# Patient Record
Sex: MALE | Race: BLACK OR AFRICAN AMERICAN | URBAN - METROPOLITAN AREA
[De-identification: names, ages, dates, MRNs, and addresses within clinical notes are randomized per-mention and may not be internally consistent; named-entity substitution may affect disease eponyms.]

---
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
## 2021-07-01 ENCOUNTER — RESULT REVIEW (OUTPATIENT)
Age: 21
End: 2021-07-01

## 2021-07-01 ENCOUNTER — APPOINTMENT (OUTPATIENT)
Dept: RHEUMATOLOGY | Facility: CLINIC | Age: 21
End: 2021-07-01
Payer: MEDICAID

## 2021-07-01 VITALS
HEART RATE: 73 BPM | RESPIRATION RATE: 16 BRPM | HEIGHT: 72 IN | SYSTOLIC BLOOD PRESSURE: 107 MMHG | BODY MASS INDEX: 23.43 KG/M2 | DIASTOLIC BLOOD PRESSURE: 58 MMHG | TEMPERATURE: 97.2 F | WEIGHT: 173 LBS | OXYGEN SATURATION: 98 %

## 2021-07-01 DIAGNOSIS — M19.079 PRIMARY OSTEOARTHRITIS, UNSPECIFIED ANKLE AND FOOT: ICD-10-CM

## 2021-07-01 PROCEDURE — 99203 OFFICE O/P NEW LOW 30 MIN: CPT

## 2021-07-02 ENCOUNTER — TRANSCRIPTION ENCOUNTER (OUTPATIENT)
Age: 21
End: 2021-07-02

## 2021-07-06 LAB
ALBUMIN SERPL ELPH-MCNC: 4.6 G/DL
ALP BLD-CCNC: 58 U/L
ALT SERPL-CCNC: 22 U/L
ANION GAP SERPL CALC-SCNC: 13 MMOL/L
AST SERPL-CCNC: 26 U/L
BASOPHILS # BLD AUTO: 0.01 K/UL
BASOPHILS NFR BLD AUTO: 0.3 %
BILIRUB SERPL-MCNC: 0.4 MG/DL
BUN SERPL-MCNC: 9 MG/DL
C TRACH RRNA SPEC QL NAA+PROBE: NOT DETECTED
CALCIUM SERPL-MCNC: 9.4 MG/DL
CCP AB SER IA-ACNC: <8 UNITS
CHLORIDE SERPL-SCNC: 106 MMOL/L
CO2 SERPL-SCNC: 23 MMOL/L
CREAT SERPL-MCNC: 1.07 MG/DL
CRP SERPL-MCNC: <3 MG/L
EOSINOPHIL # BLD AUTO: 0.02 K/UL
EOSINOPHIL NFR BLD AUTO: 0.7 %
ERYTHROCYTE [SEDIMENTATION RATE] IN BLOOD BY WESTERGREN METHOD: 3 MM/HR
GLUCOSE SERPL-MCNC: 81 MG/DL
HCT VFR BLD CALC: 39.1 %
HGB BLD-MCNC: 12.9 G/DL
IMM GRANULOCYTES NFR BLD AUTO: 0.3 %
LYMPHOCYTES # BLD AUTO: 1.26 K/UL
LYMPHOCYTES NFR BLD AUTO: 44.1 %
MAN DIFF?: NORMAL
MCHC RBC-ENTMCNC: 27.3 PG
MCHC RBC-ENTMCNC: 33 GM/DL
MCV RBC AUTO: 82.8 FL
MONOCYTES # BLD AUTO: 0.21 K/UL
MONOCYTES NFR BLD AUTO: 7.3 %
N GONORRHOEA RRNA SPEC QL NAA+PROBE: NOT DETECTED
NEUTROPHILS # BLD AUTO: 1.35 K/UL
NEUTROPHILS NFR BLD AUTO: 47.3 %
PLATELET # BLD AUTO: 192 K/UL
POTASSIUM SERPL-SCNC: 4.3 MMOL/L
PROT SERPL-MCNC: 6.8 G/DL
RBC # BLD: 4.72 M/UL
RBC # FLD: 14.6 %
RF+CCP IGG SER-IMP: NEGATIVE
RHEUMATOID FACT SER QL: <10 IU/ML
SODIUM SERPL-SCNC: 143 MMOL/L
SOURCE AMPLIFICATION: NORMAL
URATE SERPL-MCNC: 8 MG/DL
WBC # FLD AUTO: 2.86 K/UL

## 2021-07-07 LAB

## 2021-07-14 ENCOUNTER — OUTPATIENT (OUTPATIENT)
Dept: OUTPATIENT SERVICES | Facility: HOSPITAL | Age: 21
LOS: 1 days | End: 2021-07-14
Payer: MEDICAID

## 2021-07-14 ENCOUNTER — APPOINTMENT (OUTPATIENT)
Dept: RADIOLOGY | Facility: CLINIC | Age: 21
End: 2021-07-14
Payer: MEDICAID

## 2021-07-14 ENCOUNTER — APPOINTMENT (OUTPATIENT)
Dept: MRI IMAGING | Facility: CLINIC | Age: 21
End: 2021-07-14
Payer: MEDICAID

## 2021-07-14 DIAGNOSIS — M19.079 PRIMARY OSTEOARTHRITIS, UNSPECIFIED ANKLE AND FOOT: ICD-10-CM

## 2021-07-14 PROCEDURE — 73600 X-RAY EXAM OF ANKLE: CPT | Mod: 26,50

## 2021-07-14 PROCEDURE — 73721 MRI JNT OF LWR EXTRE W/O DYE: CPT | Mod: 26,RT

## 2021-07-14 PROCEDURE — 73721 MRI JNT OF LWR EXTRE W/O DYE: CPT

## 2021-07-14 PROCEDURE — 73600 X-RAY EXAM OF ANKLE: CPT

## 2021-07-20 ENCOUNTER — APPOINTMENT (OUTPATIENT)
Dept: ORTHOPEDIC SURGERY | Facility: CLINIC | Age: 21
End: 2021-07-20
Payer: MEDICAID

## 2021-07-20 VITALS
HEART RATE: 76 BPM | BODY MASS INDEX: 22.53 KG/M2 | DIASTOLIC BLOOD PRESSURE: 67 MMHG | SYSTOLIC BLOOD PRESSURE: 110 MMHG | WEIGHT: 170 LBS | HEIGHT: 73 IN

## 2021-07-20 DIAGNOSIS — M21.41 FLAT FOOT [PES PLANUS] (ACQUIRED), RIGHT FOOT: ICD-10-CM

## 2021-07-20 DIAGNOSIS — M24.673 ANKYLOSIS, UNSPECIFIED ANKLE: ICD-10-CM

## 2021-07-20 DIAGNOSIS — M19.071 PRIMARY OSTEOARTHRITIS, RIGHT ANKLE AND FOOT: ICD-10-CM

## 2021-07-20 DIAGNOSIS — M21.42 FLAT FOOT [PES PLANUS] (ACQUIRED), RIGHT FOOT: ICD-10-CM

## 2021-07-20 DIAGNOSIS — M94.279 CHONDROMALACIA, UNSPECIFIED ANKLE AND JOINTS OF FOOT: ICD-10-CM

## 2021-07-20 DIAGNOSIS — M25.871 OTHER SPECIFIED JOINT DISORDERS, RIGHT ANKLE AND FOOT: ICD-10-CM

## 2021-07-20 PROCEDURE — 73610 X-RAY EXAM OF ANKLE: CPT | Mod: RT

## 2021-07-20 PROCEDURE — 99203 OFFICE O/P NEW LOW 30 MIN: CPT

## 2023-06-22 NOTE — ED PEDIATRIC TRIAGE NOTE - AS TEMP SITE
received fax from [facility] [address] dated on 06/22/23 stating the [facility] 10MG/0.5ML SOLUTION PEN-INJ has been denied due to pt not trying and had a poor response to three medications that are used to treat pt condition for at least 2 weeks.     #[id number]    CASE#[id number] oral

## 2024-08-30 ENCOUNTER — EMERGENCY (EMERGENCY)
Facility: HOSPITAL | Age: 24
LOS: 1 days | Discharge: HOME | End: 2024-08-30
Attending: INTERNAL MEDICINE | Admitting: EMERGENCY MEDICINE
Payer: SELF-PAY

## 2024-08-30 VITALS
RESPIRATION RATE: 15 BRPM | HEART RATE: 66 BPM | DIASTOLIC BLOOD PRESSURE: 80 MMHG | OXYGEN SATURATION: 99 % | SYSTOLIC BLOOD PRESSURE: 144 MMHG | WEIGHT: 166.01 LBS | TEMPERATURE: 99 F

## 2024-08-30 VITALS
DIASTOLIC BLOOD PRESSURE: 59 MMHG | RESPIRATION RATE: 20 BRPM | SYSTOLIC BLOOD PRESSURE: 121 MMHG | TEMPERATURE: 98 F | HEART RATE: 52 BPM | OXYGEN SATURATION: 98 %

## 2024-08-30 LAB
ALBUMIN SERPL ELPH-MCNC: 5.4 G/DL — HIGH (ref 3.3–5)
ALP SERPL-CCNC: 60 U/L — SIGNIFICANT CHANGE UP (ref 30–120)
ALT FLD-CCNC: 19 U/L — SIGNIFICANT CHANGE UP (ref 10–60)
ANION GAP SERPL CALC-SCNC: 10 MMOL/L — SIGNIFICANT CHANGE UP (ref 5–17)
APTT BLD: 37.3 SEC — HIGH (ref 24.5–35.6)
AST SERPL-CCNC: 30 U/L — SIGNIFICANT CHANGE UP (ref 10–40)
BASOPHILS # BLD AUTO: 0.03 K/UL — SIGNIFICANT CHANGE UP (ref 0–0.2)
BASOPHILS NFR BLD AUTO: 0.4 % — SIGNIFICANT CHANGE UP (ref 0–2)
BILIRUB SERPL-MCNC: 1 MG/DL — SIGNIFICANT CHANGE UP (ref 0.2–1.2)
BUN SERPL-MCNC: 11 MG/DL — SIGNIFICANT CHANGE UP (ref 7–23)
CALCIUM SERPL-MCNC: 10.5 MG/DL — SIGNIFICANT CHANGE UP (ref 8.4–10.5)
CHLORIDE SERPL-SCNC: 102 MMOL/L — SIGNIFICANT CHANGE UP (ref 96–108)
CO2 SERPL-SCNC: 28 MMOL/L — SIGNIFICANT CHANGE UP (ref 22–31)
CREAT SERPL-MCNC: 1.26 MG/DL — SIGNIFICANT CHANGE UP (ref 0.5–1.3)
D DIMER BLD IA.RAPID-MCNC: <150 NG/ML DDU — SIGNIFICANT CHANGE UP
EGFR: 82 ML/MIN/1.73M2 — SIGNIFICANT CHANGE UP
EOSINOPHIL # BLD AUTO: 0 K/UL — SIGNIFICANT CHANGE UP (ref 0–0.5)
EOSINOPHIL NFR BLD AUTO: 0 % — SIGNIFICANT CHANGE UP (ref 0–6)
GLUCOSE SERPL-MCNC: 93 MG/DL — SIGNIFICANT CHANGE UP (ref 70–99)
HCT VFR BLD CALC: 47 % — SIGNIFICANT CHANGE UP (ref 39–50)
HGB BLD-MCNC: 16.1 G/DL — SIGNIFICANT CHANGE UP (ref 13–17)
IMM GRANULOCYTES NFR BLD AUTO: 0.1 % — SIGNIFICANT CHANGE UP (ref 0–0.9)
INR BLD: 1.15 RATIO — SIGNIFICANT CHANGE UP (ref 0.85–1.18)
LYMPHOCYTES # BLD AUTO: 1.84 K/UL — SIGNIFICANT CHANGE UP (ref 1–3.3)
LYMPHOCYTES # BLD AUTO: 27.1 % — SIGNIFICANT CHANGE UP (ref 13–44)
MAGNESIUM SERPL-MCNC: 2 MG/DL — SIGNIFICANT CHANGE UP (ref 1.6–2.6)
MCHC RBC-ENTMCNC: 27.9 PG — SIGNIFICANT CHANGE UP (ref 27–34)
MCHC RBC-ENTMCNC: 34.3 GM/DL — SIGNIFICANT CHANGE UP (ref 32–36)
MCV RBC AUTO: 81.5 FL — SIGNIFICANT CHANGE UP (ref 80–100)
MONOCYTES # BLD AUTO: 0.54 K/UL — SIGNIFICANT CHANGE UP (ref 0–0.9)
MONOCYTES NFR BLD AUTO: 8 % — SIGNIFICANT CHANGE UP (ref 2–14)
NEUTROPHILS # BLD AUTO: 4.37 K/UL — SIGNIFICANT CHANGE UP (ref 1.8–7.4)
NEUTROPHILS NFR BLD AUTO: 64.4 % — SIGNIFICANT CHANGE UP (ref 43–77)
NRBC # BLD: 0 /100 WBCS — SIGNIFICANT CHANGE UP (ref 0–0)
NT-PROBNP SERPL-SCNC: 9 PG/ML — SIGNIFICANT CHANGE UP (ref 0–125)
PLATELET # BLD AUTO: 216 K/UL — SIGNIFICANT CHANGE UP (ref 150–400)
POTASSIUM SERPL-MCNC: 3.8 MMOL/L — SIGNIFICANT CHANGE UP (ref 3.5–5.3)
POTASSIUM SERPL-SCNC: 3.8 MMOL/L — SIGNIFICANT CHANGE UP (ref 3.5–5.3)
PROT SERPL-MCNC: 9 G/DL — HIGH (ref 6–8.3)
PROTHROM AB SERPL-ACNC: 12.8 SEC — SIGNIFICANT CHANGE UP (ref 9.5–13)
RBC # BLD: 5.77 M/UL — SIGNIFICANT CHANGE UP (ref 4.2–5.8)
RBC # FLD: 13.2 % — SIGNIFICANT CHANGE UP (ref 10.3–14.5)
SODIUM SERPL-SCNC: 140 MMOL/L — SIGNIFICANT CHANGE UP (ref 135–145)
TROPONIN I, HIGH SENSITIVITY RESULT: <4 NG/L — SIGNIFICANT CHANGE UP
WBC # BLD: 6.79 K/UL — SIGNIFICANT CHANGE UP (ref 3.8–10.5)
WBC # FLD AUTO: 6.79 K/UL — SIGNIFICANT CHANGE UP (ref 3.8–10.5)

## 2024-08-30 PROCEDURE — 93005 ELECTROCARDIOGRAM TRACING: CPT

## 2024-08-30 PROCEDURE — 80053 COMPREHEN METABOLIC PANEL: CPT

## 2024-08-30 PROCEDURE — 99284 EMERGENCY DEPT VISIT MOD MDM: CPT

## 2024-08-30 PROCEDURE — 85610 PROTHROMBIN TIME: CPT

## 2024-08-30 PROCEDURE — 36415 COLL VENOUS BLD VENIPUNCTURE: CPT

## 2024-08-30 PROCEDURE — 83735 ASSAY OF MAGNESIUM: CPT

## 2024-08-30 PROCEDURE — 83880 ASSAY OF NATRIURETIC PEPTIDE: CPT

## 2024-08-30 PROCEDURE — 85025 COMPLETE CBC W/AUTO DIFF WBC: CPT

## 2024-08-30 PROCEDURE — 85379 FIBRIN DEGRADATION QUANT: CPT

## 2024-08-30 PROCEDURE — 71046 X-RAY EXAM CHEST 2 VIEWS: CPT

## 2024-08-30 PROCEDURE — 93010 ELECTROCARDIOGRAM REPORT: CPT

## 2024-08-30 PROCEDURE — 71046 X-RAY EXAM CHEST 2 VIEWS: CPT | Mod: 26

## 2024-08-30 PROCEDURE — 96374 THER/PROPH/DIAG INJ IV PUSH: CPT

## 2024-08-30 PROCEDURE — 99285 EMERGENCY DEPT VISIT HI MDM: CPT | Mod: 25

## 2024-08-30 PROCEDURE — 85730 THROMBOPLASTIN TIME PARTIAL: CPT

## 2024-08-30 PROCEDURE — 84484 ASSAY OF TROPONIN QUANT: CPT

## 2024-08-30 RX ORDER — ACETAMINOPHEN 325 MG/1
1000 TABLET ORAL ONCE
Refills: 0 | Status: COMPLETED | OUTPATIENT
Start: 2024-08-30 | End: 2024-08-30

## 2024-08-30 RX ADMIN — ACETAMINOPHEN 400 MILLIGRAM(S): 325 TABLET ORAL at 18:53

## 2024-08-30 NOTE — ED ADULT NURSE NOTE - OBJECTIVE STATEMENT
I have chest pain since yesterday, I have been smoking marihuana and vape.    patient has no pmh and ekg done, IV accessed and tolerating. Labs drawn & sent results pending. ekg done,

## 2024-08-30 NOTE — ED PROVIDER NOTE - PATIENT PORTAL LINK FT
You can access the FollowMyHealth Patient Portal offered by [facility] by registering at the following website: [email or web address]. By joining [facility]’s FollowMyHealth portal, you will also be able to view your health information using other applications (apps) compatible with our system.

## 2024-08-30 NOTE — ED PROVIDER NOTE - TEMPLATE, MLM
Consult received from RN, requesting MSW-CM to meet with Patient's Mother per her request  Mother with Housing questions  SRIRAM-CM met with Patient and Mother in exam-room, introduced self, role and reason for visit  Mother requesting assistance with applying for Housing  [employer] information given to Mother to apply on line  They are accepting applications online only  Also encouraged to apply for housing in other counties  Mother agreed with plan  SRIRAM-CM will remain available as needed 
General

## 2024-08-30 NOTE — ED PROVIDER NOTE - NSFOLLOWUPINSTRUCTIONS_ED_ALL_ED_FT
Chest pain can be caused by a range of conditions, from not serious to life-threatening. Chest pain can be a symptom of a digestive problem, such as acid reflux or a stomach ulcer. An anxiety attack or a strong emotion, such as anger, can also cause chest pain. Infection, inflammation, or a fracture in the bones or cartilage in your chest can cause pain or discomfort. Sometimes chest pain or pressure is caused by poor blood flow to your heart (angina). Chest pain may also be caused by life-threatening conditions such as a heart attack or blood clot in your lungs.    DISCHARGE INSTRUCTIONS:    Call your local emergency number (911 in the ) or have someone call if:    You have any of the following signs of a heart attack:  Squeezing, pressure, or pain in your chest    You may also have any of the following:  Discomfort or pain in your back, neck, jaw, stomach, or arm    Shortness of breath    Nausea or vomiting    Lightheadedness or a sudden cold sweat    Seek care immediately if:    You have chest discomfort that gets worse, even with medicine.    You cough or vomit blood.    Your bowel movements are black or bloody.    You cannot stop vomiting, or it hurts to swallow.  Call your doctor if:    You have questions or concerns about your condition or care.    Medicines:    Medicines may be given to treat the cause of your chest pain. Examples include pain medicine, anxiety medicine, or medicines to increase blood flow to your heart.    Do not take certain medicines without asking your healthcare provider first. These include NSAIDs, herbal or vitamin supplements, and hormones, such as estrogen or progestin.    Take your medicine as directed. Contact your healthcare provider if you think your medicine is not helping or if you have side effects. Tell your provider if you are allergic to any medicine. Keep a list of the medicines, vitamins, and herbs you take. Include the amounts, and when and why you take them. Bring the list or the pill bottles to follow-up visits. Carry your medicine list with you in case of an emergency.  Healthy living tips: If the cause of your chest pain is known, your healthcare provider will give you specific guidelines to follow. The following are general healthy guidelines:    Do not smoke. Nicotine and other chemicals in cigarettes and cigars can cause lung and heart damage. Ask your healthcare provider for information if you currently smoke and need help to quit. E-cigarettes or smokeless tobacco still contain nicotine. Talk to your healthcare provider before you use these products.    Choose a variety of healthy foods as often as possible. Include fresh, frozen, or canned fruits and vegetables. Also include low-fat dairy products, fish, chicken (without skin), and lean meats. Your healthcare provider or a dietitian can help you create meal plans. You may need to avoid certain foods or drinks if your pain is caused by a digestion problem.  Healthy Foods      Lower your sodium (salt) intake. Limit foods that are high in sodium, such as canned foods, salty snacks, and cold cuts. If you add salt when you cook food, do not add more at the table. Choose low-sodium canned foods as much as possible.        Drink plenty of water every day. Water helps your body to control temperature and blood pressure. Ask your healthcare provider how much water you should drink every day.    Ask about activity. Your healthcare provider will tell you which activities to limit or avoid. Ask when you can drive, return to work, and have sex. Ask about the best exercise plan for you.    Maintain a healthy weight. Ask your healthcare provider what a healthy weight is for you. Ask him or her to help you create a safe weight loss plan if you are overweight.    Ask about vaccines you may need. Your healthcare provider can tell you which vaccines you need, and when to get them. The following vaccines help prevent diseases that can become serious for a person with a heart condition:  The influenza (flu) vaccine is given each year. Get a flu vaccine as soon as recommended, usually in September or October.    The pneumonia vaccine is usually given every 5 years. Your healthcare provider may recommend the pneumonia vaccine if you are 65 or older.    COVID-19 vaccines are given to adults as a shot. At least 1 dose of an updated vaccine is recommended for all adults. COVID-19 vaccines are updated throughout the year. Adults 65 or older need a second dose of updated vaccine at least 4 months after the first dose. Your healthcare provider can help you schedule all needed doses as updated vaccines become available.  Prevent Heart Disease   Follow up with your doctor within 72 hours, or as directed: You may need to return for more tests to find the cause of your chest pain. You may be referred to a specialist, such as a cardiologist or gastroenterologist. Write down your questions so you remember to ask them during your visits.

## 2024-08-30 NOTE — ED PROVIDER NOTE - NSFOLLOWUPCLINICS_GEN_ALL_ED_FT
[facility]  Internal Medicine  [address] [place] [id number]  Phone: [phone]  Fax:

## 2024-08-30 NOTE — ED PROVIDER NOTE - CLINICAL SUMMARY MEDICAL DECISION MAKING FREE TEXT BOX
24 M denies pmhx c/o midsternal chest pain x today while at work. States the past few days he has felt like he is not breathing as well as usual. Admits to daily vaping of nicotine and marijuana and feels this is contributing to his symptoms. States last night he felt tingling to fingers and toes. Denies fever, chills, palpitations, cough, abd pain, n/v, leg pain/swelling. VSS Exam stable labs xray ekg and enzymes are non acute , discharged to [facility]

## 2024-08-30 NOTE — ED PROVIDER NOTE - OBJECTIVE STATEMENT
24 M denies pmhx c/o midsternal chest pain x today while at work. States the past few days he has felt like he is not breathing as well as usual. Admits to daily vaping of nicotine and marijuana and feels this is contributing to his symptoms. States last night he felt tingling to fingers and toes. Denies fever, chills, palpitations, cough, abd pain, n/v, leg pain/swelling.

## 2024-12-13 NOTE — ED PROVIDER NOTE - NSTIMEPROVIDERCAREINITIATE_GEN_ER
Called pt to confirm appt for tomorrow. Was able to speak with pt and confirmed appt for tomorrow. Pt had no further questions or concerns.   
18-May-2019 20:44